# Patient Record
Sex: MALE | Race: WHITE | NOT HISPANIC OR LATINO | Employment: FULL TIME | URBAN - METROPOLITAN AREA
[De-identification: names, ages, dates, MRNs, and addresses within clinical notes are randomized per-mention and may not be internally consistent; named-entity substitution may affect disease eponyms.]

---

## 2018-01-10 ENCOUNTER — NON-PROVIDER VISIT (OUTPATIENT)
Dept: OCCUPATIONAL MEDICINE | Facility: CLINIC | Age: 58
End: 2018-01-10
Payer: COMMERCIAL

## 2018-01-10 ENCOUNTER — HOSPITAL ENCOUNTER (EMERGENCY)
Facility: MEDICAL CENTER | Age: 58
End: 2018-01-10
Attending: EMERGENCY MEDICINE
Payer: COMMERCIAL

## 2018-01-10 VITALS
TEMPERATURE: 97.3 F | SYSTOLIC BLOOD PRESSURE: 131 MMHG | OXYGEN SATURATION: 97 % | BODY MASS INDEX: 25.88 KG/M2 | HEART RATE: 60 BPM | HEIGHT: 70 IN | RESPIRATION RATE: 14 BRPM | WEIGHT: 180.78 LBS | DIASTOLIC BLOOD PRESSURE: 80 MMHG

## 2018-01-10 DIAGNOSIS — S61.216A LACERATION OF RIGHT LITTLE FINGER WITHOUT FOREIGN BODY WITHOUT DAMAGE TO NAIL, INITIAL ENCOUNTER: ICD-10-CM

## 2018-01-10 DIAGNOSIS — Z02.83 ENCOUNTER FOR DRUG SCREENING: ICD-10-CM

## 2018-01-10 PROCEDURE — 82075 ASSAY OF BREATH ETHANOL: CPT | Performed by: INTERNAL MEDICINE

## 2018-01-10 PROCEDURE — 304999 HCHG REPAIR-SIMPLE/INTERMED LEVEL 1: Mod: EDC

## 2018-01-10 PROCEDURE — 303353 HCHG DERMABOND SKIN ADHESIVE: Mod: EDC

## 2018-01-10 PROCEDURE — 8907 PR URINE COLLECT ONLY: Performed by: INTERNAL MEDICINE

## 2018-01-10 PROCEDURE — 99026 IN-HOSPITAL ON CALL SERVICE: CPT | Performed by: INTERNAL MEDICINE

## 2018-01-10 PROCEDURE — 304217 HCHG IRRIGATION SYSTEM: Mod: EDC

## 2018-01-10 PROCEDURE — 99283 EMERGENCY DEPT VISIT LOW MDM: CPT | Mod: EDC

## 2018-01-10 ASSESSMENT — PAIN SCALES - GENERAL
PAINLEVEL_OUTOF10: 1
PAINLEVEL_OUTOF10: 0
PAINLEVEL_OUTOF10: 0

## 2018-01-10 NOTE — ED NOTES
Pt left ED ambulatory and in NAD. Discharge instructions discussed with as well as importance of follow up care, wound care instrutions discussed, verbalized understanding.

## 2018-01-10 NOTE — ED PROVIDER NOTES
"ED Provider Note    Scribed for Holland Edgar M.D. by Joana Bay. 1/10/2018  12:37 PM    Primary care provider: None.  Means of arrival: Private vehicle  History obtained from: Patient  History limited by: None    CHIEF COMPLAINT  Chief Complaint   Patient presents with   • Digit Pain     left 5th digit     HPI  Rashawn Goldman is a 57 y.o. male who presents to the Emergency Department for left fifth digit pain. Patient reports he was at work hanging a metal tab on the wall and the tab fell and hit his left fifth digit. Denies tetanus shot is up to date. He states associated swelling and pain to the finger. Denies left hand pain or wrist pain. Denies further medical complaints.     REVIEW OF SYSTEMS  Pertinent negatives include no left hand pain or left wrist pain.      PAST MEDICAL HISTORY   Patient denies pertinent medical history.     SURGICAL HISTORY  patient denies any surgical history    SOCIAL HISTORY  Social History   Substance Use Topics   • Smoking status: Never Smoker   • Smokeless tobacco: Never Used   • Alcohol use Yes      Comment: rare      History   Drug Use No     FAMILY HISTORY  History reviewed. No pertinent family history.    CURRENT MEDICATIONS  No current facility-administered medications on file prior to encounter.      No current outpatient prescriptions on file prior to encounter.     ALLERGIES  No Known Allergies    PHYSICAL EXAM  VITAL SIGNS: /86   Pulse 77   Temp 36.4 °C (97.5 °F) (Temporal)   Resp 16   Ht 1.778 m (5' 10\")   Wt 82 kg (180 lb 12.4 oz)   SpO2 95%   BMI 25.94 kg/m²   Constitutional: Well developed, Well nourished, No acute distress, Non-toxic appearance.   HENT: normal  Eyes: normal  Skin: Patient has a 1.5 cm laceration between the DIP joint and the PIP joint of the right 5th digit. There is no evidence of gross contamination or foreign body  Extremities: normal   Neurologic: Alert. No focal deficits.     Laceration Repair Procedure Note    Indication: " Laceration    Procedure: The patient was placed in the appropriate position and anesthesia around the laceration was obtained by infiltration using 0.5% Bupivacaine without epinephrine. The area was then cleansed with betadine and draped in a sterile fashion. The laceration was closed with Dermabond. There were no additional lacerations requiring repair. The wound area was then dressed with a sterile dressing.      Total repaired wound length: 1.5 cm.     Other Items: None    The patient tolerated the procedure well.    Complications: None    COURSE & MEDICAL DECISION MAKING  Nursing notes, VS, PMSFHx reviewed in chart.    12:37 PM Patient seen and examined at bedside. Patient was treated with 0.5% Bupavaciain for his symptoms. Ordered Dermabond.     1:17 PM- Dermabond procedure performed, see above procedure note. I explained that he is now stable for discharge. I advised him to follow up with his primary care provider. He understands and will comply.     Patient has had high blood pressure while in the emergency department, felt likely secondary to medical condition. Counseled patient to monitor blood pressure at home and follow up with primary care physician.     The patient will return for new or worsening symptoms and is stable at the time of discharge.    DISPOSITION:  Patient will be discharged home in stable condition.    FOLLOW UP:  No follow-up provider specified.    OUTPATIENT MEDICATIONS:  New Prescriptions    No medications on file     FINAL IMPRESSION  1. Laceration of right little finger without foreign body without damage to nail, initial encounter        Joana LUQUE (Scribe), am scribing for, and in the presence of, Holland Edgar M.D..    Electronically signed by: Joana Bay (Elle), 1/10/2018    Holland LUQUE M.D. personally performed the services described in this documentation, as scribed by Joana Bay in my presence, and it is both accurate and complete.    The note  accurately reflects work and decisions made by me.  Holland Edgar  1/10/2018  1:21 PM

## 2018-01-10 NOTE — ED NOTES
Pt ambulated to Y40. Pt given gown and refused. Call light in reach. No questions at this time.

## 2018-01-10 NOTE — ED NOTES
"Chief Complaint   Patient presents with   • Digit Pain     left 5th digit     Pt at work hanging object on the wall, object fell and sustained right 5th digit laceration. Bandage I place and bleeding controlled. Pt denies pain at this time. OOD with tetanus vacc.  Blood pressure 124/86, pulse 77, temperature 36.4 °C (97.5 °F), temperature source Temporal, resp. rate 16, height 1.778 m (5' 10\"), weight 82 kg (180 lb 12.4 oz), SpO2 95 %.      "

## 2018-01-10 NOTE — LETTER
"  FORM C-4:  EMPLOYEE’S CLAIM FOR COMPENSATION/ REPORT OF INITIAL TREATMENT  EMPLOYEE’S CLAIM - PROVIDE ALL INFORMATION REQUESTED   First Name  Rashawn Last Name  Jones Birthdate             Age  1960 57 y.o. Sex  male Claim Number   Home Employee Address  [address]                                     Zip  [address] Height  1.778 m (5' 10\") Weight  82 kg (180 lb 12.4 oz) [facility]     Mailing Employee Address                           [address]               Zip  [address] Telephone  [phone] (home)  Primary Language Spoken  ENGLISH   Insurer  [employer]   [employer] Employee's Occupation (Job Title) When Injury or Occupational Disease Occurred  no ervinvenessa bowmanshaun   Employer's Name  [employer] Telephone  [phone]    Employer Address  [address]] Zip  [address]   Date of Injury  1/10/2018       Hour of Injury  11:10 AM Date Employer Notified  1/10/2018 Last Day of Work after Injury or Occupational Disease  1/10/2018 Supervisor to Whom Injury Reported  César Jones   Address or Location of Accident (if applicable)  [[address]]   What were you doing at the time of accident? (if applicable)  Adjusting Placards-new cabinet    How did this injury or occupational disease occur? Be specific and answer in detail. Use additional sheet if necessary)  While removing placard, when released finger caught metal tab.   If you believe that you have an occupational disease, when did you first have knowledge of the disability and it relationship to your employment?  n/a Witnesses to the Accident  Adriana Chirinos     Nature of Injury or Occupational Disease  Laceration  Part(s) of Body Injured or Affected  Finger (R), N/A, N/A    I certify that the above is true and correct to the best of my knowledge and that I have provided this information in order to obtain the benefits of " [place]  I hereby authorize any physician, chiropractor, surgeon, practitioner, or other person, any hospital, including [facility] or [facility], any medical service organization, any insurance company, or other institution or organization to release to each other, any medical or other information, including benefits paid or payable, pertinent to this injury or disease, except information relative to diagnosis, treatment and/or counseling for AIDS, psychological conditions, alcohol or controlled substances, for which I must give specific authorization.  A Photostat of this authorization shall be as valid as the original.   Date   01/10/2018 Counts include 234 beds at the [facility]   Employee’s Signature   THIS REPORT MUST BE COMPLETED AND MAILED WITHIN 3 WORKING DAYS OF TREATMENT   Place  [facility], EMERGENCY DEPT  Name of Facility   [facility]   Date  1/10/2018 Diagnosis  (S61.216A) Laceration of right little finger without foreign body without damage to nail, initial encounter Is there evidence the injured employee was under the influence of alcohol and/or another controlled substance at the time of accident?   Hour  1:30 PM Description of Injury or Disease  Laceration of right little finger without foreign body without damage to nail, initial encounter No   Treatment  Laceration repair with Dermabond and bandage  Have you advised the patient to remain off work five days or more?         No   X-Ray Findings      If Yes   From Date    To Date      From information given by the employee, together with medical evidence, can you directly connect this injury or occupational disease as job incurred?  No If No, is the employee capable of: Full Duty  Yes Modified Duty      Is additional medical care by a physician indicated?  No If Modified Duty, Specify any Limitations  "/ Restrictions        Do you know of any previous injury or disease contributing to this condition or occupational disease?  No   Date  1/10/2018 Print Doctor’s Name  Jessica Edgar I certify the employer’s copy of this form was mailed on:   Address  [address] [address] [phone] Insurer’s Use Only   [facility]  [id number]    Provider’s Tax ID Number  [id number] Telephone  Dept: [phone]    Doctor’s Signature  e-JESSICA Hernandez M.D. Degree  M.D.    Original - TREATING PHYSICIAN OR CHIROPRACTOR   Pg 2-Insurer/TPA   Pg 3-Employer   Pg 4-Employee                                                                                                  Form C-4 (rev01/03)     BRIEF DESCRIPTION OF RIGHTS AND BENEFITS  (Pursuant to [place] 616[place])    Notice of Injury or Occupational Disease (Incident Report Form C-1): If an injury or occupational disease (OD) arises out of and in the course of employment, you must provide written notice to your employer as soon as practicable, but no later than 7 days after the accident or OD. Your employer shall maintain a sufficient supply of the required forms.    Claim for Compensation (Form C-4): If medical treatment is sought, the form C-4 is available at the place of initial treatment. A completed \"Claim for Compensation\" (Form C-4) must be filed within 90 days after an accident or OD. The treating physician or chiropractor must, within 3 working days after treatment, complete and mail to the employer, the employer's insurer and third-party , the Claim for Compensation.    Medical Treatment: If you require medical treatment for your on-the-job injury or OD, you may be required to select a physician or chiropractor from a list provided by your workers’ compensation insurer, if it has contracted with an Organization for Managed Care (MCO) or Preferred Provider Organization (PPO) or providers of health care. If your employer has not entered " into a contract with an MCO or PPO, you may select a physician or chiropractor from the Panel of Physicians and Chiropractors. Any medical costs related to your industrial injury or OD will be paid by your insurer.    Temporary Total Disability (TTD): If your doctor has certified that you are unable to work for a period of at least 5 consecutive days, or 5 cumulative days in a 20-day period, or places restrictions on you that your employer does not accommodate, you may be entitled to TTD compensation.    Temporary Partial Disability (TPD): If the wage you receive upon reemployment is less than the compensation for TTD to which you are entitled, the insurer may be required to pay you TPD compensation to make up the difference. TPD can only be paid for a maximum of 24 months.    Permanent Partial Disability (PPD): When your medical condition is stable and there is an indication of a PPD as a result of your injury or OD, within 30 days, your insurer must arrange for an evaluation by a rating physician or chiropractor to determine the degree of your PPD. The amount of your PPD award depends on the date of injury, the results of the PPD evaluation and your age and wage.    Permanent Total Disability (PTD): If you are medically certified by a treating physician or chiropractor as permanently and totally disabled and have been granted a PTD status by your insurer, you are entitled to receive monthly benefits not to exceed 66 2/3% of your average monthly wage. The amount of your PTD payments is subject to reduction if you previously received a PPD award.    Vocational Rehabilitation Services: You may be eligible for vocational rehabilitation services if you are unable to return to the job due to a permanent physical impairment or permanent restrictions as a result of your injury or occupational disease.    Transportation and Per Enrrique Reimbursement: You may be eligible for travel expenses and per enrrique associated with medical  treatment.  Reopening: You may be able to reopen your claim if your condition worsens after claim closure.    Appeal Process: If you disagree with a written determination issued by the insurer or the insurer does not respond to your request, you may appeal to the Department of Administration, , by following the instructions contained in your determination letter. You must appeal the determination within 70 days from the date of the determination letter at [address], [address], [place], [place] [address], or [address], [address], [place], [place] [address]. If you disagree with the  decision, you may appeal to the Department of Administration, . You must file your appeal within 30 days from the date of the  decision letter at [address], [address], [place], [place] [address], or [address], [place], [place] [address]. If you disagree with a decision of an , you may file a petition for judicial review with the District Court. You must do so within 30 days of the Appeal Officer’s decision. You may be represented by an  at your own expense or you may contact the [facility] for possible representation.    [place]  for Injured Workers (NAIW): If you disagree with a  decision, you may request that NAIW represent you without charge at an  Hearing. For information regarding denial of benefits, you may contact the [facility] at: [address], [address], [place] [address], [phone], or [address], [address], [place] [address], [phone]    To File a Complaint with the Division: If you wish to file a complaint with the  of the Division of Industrial Relations (DIR), please contact the Workers’ Compensation Section, [address], [address], [place], [place] [address], telephone [phone], or [address], [address], [place], [place] [address], telephone [phone].    For assistance with Workers’ Compensation Issues: you may contact the Office of the Governor Consumer Health Assistance, [address] [place] [address], [address], [place], [place] [address], Toll Free 1-253.641.3012, Web site: http://[employer].nv., E-mail [email or web address].                                                                                                                                                                               __________________________________________________________________                                    _01/10/2018_            Employee Name / Signature                                                                                                                            Date                                       D-2 (rev. 10/07)

## 2018-01-15 LAB
BREATH ALCOHOL COMMENT: NORMAL
POC BREATHALIZER: 0 PERCENT (ref 0–0.01)

## 2018-01-16 NOTE — PROGRESS NOTES
johnson was called during business hours to [facility] for a post accident UDS and BAT on 1/10/18 for [employer].    UDS collected at 1:30 pm.  BAT collected at 13:20.

## 2020-11-24 ENCOUNTER — HOSPITAL ENCOUNTER (EMERGENCY)
Facility: MEDICAL CENTER | Age: 60
End: 2020-11-24
Payer: COMMERCIAL

## 2020-11-24 VITALS
OXYGEN SATURATION: 97 % | DIASTOLIC BLOOD PRESSURE: 94 MMHG | SYSTOLIC BLOOD PRESSURE: 142 MMHG | TEMPERATURE: 98 F | BODY MASS INDEX: 26.71 KG/M2 | RESPIRATION RATE: 15 BRPM | HEART RATE: 73 BPM | WEIGHT: 180.34 LBS | HEIGHT: 69 IN

## 2020-11-24 LAB — EKG IMPRESSION: NORMAL

## 2020-11-24 PROCEDURE — 302449 STATCHG TRIAGE ONLY (STATISTIC)

## 2020-11-24 PROCEDURE — 93005 ELECTROCARDIOGRAM TRACING: CPT

## 2020-11-24 NOTE — ED TRIAGE NOTES
"Chief Complaint   Patient presents with   • Lightheadedness     Pt reports starting feeling lightheaded while out walking w/ spouse, denies c/o pain at this time     /94   Pulse 73   Temp 36.7 °C (98 °F) (Temporal)   Resp 15   Ht 1.753 m (5' 9\")   Wt 81.8 kg (180 lb 5.4 oz)   SpO2 97%   BMI 26.63 kg/m²     Covid Screen Negative      "